# Patient Record
Sex: MALE
[De-identification: names, ages, dates, MRNs, and addresses within clinical notes are randomized per-mention and may not be internally consistent; named-entity substitution may affect disease eponyms.]

---

## 2019-05-09 PROBLEM — Z00.00 ENCOUNTER FOR PREVENTIVE HEALTH EXAMINATION: Status: ACTIVE | Noted: 2019-05-09

## 2019-05-17 ENCOUNTER — APPOINTMENT (OUTPATIENT)
Dept: PULMONOLOGY | Facility: CLINIC | Age: 84
End: 2019-05-17

## 2019-05-20 ENCOUNTER — APPOINTMENT (OUTPATIENT)
Dept: GERIATRICS | Facility: HOME HEALTH | Age: 84
End: 2019-05-20
Payer: MEDICARE

## 2019-05-20 DIAGNOSIS — G89.3 NEOPLASM RELATED PAIN (ACUTE) (CHRONIC): ICD-10-CM

## 2019-05-20 PROCEDURE — 99341 HOME/RES VST NEW SF MDM 15: CPT

## 2019-05-20 NOTE — REASON FOR VISIT
[Initial Eval - Existing Diagnosis] : an initial evaluation of an existing diagnosis [Formal Caregiver] : formal caregiver

## 2019-05-23 PROBLEM — G89.3 CANCER-RELATED PAIN: Status: ACTIVE | Noted: 2019-05-23

## 2019-05-23 RX ORDER — FENTANYL 12 UG/H
12 PATCH, EXTENDED RELEASE TRANSDERMAL
Qty: 10 | Refills: 0 | Status: ACTIVE | COMMUNITY
Start: 2019-05-23 | End: 1900-01-01

## 2019-05-23 RX ORDER — LORAZEPAM 1 MG/1
1 TABLET ORAL
Qty: 20 | Refills: 0 | Status: ACTIVE | COMMUNITY
Start: 2019-05-19

## 2019-05-23 RX ORDER — INSULIN GLARGINE 100 [IU]/ML
100 INJECTION, SOLUTION SUBCUTANEOUS
Qty: 9 | Refills: 0 | Status: ACTIVE | COMMUNITY
Start: 2019-05-20

## 2019-05-24 ENCOUNTER — APPOINTMENT (OUTPATIENT)
Dept: GERIATRICS | Facility: HOME HEALTH | Age: 84
End: 2019-05-24
Payer: MEDICARE

## 2019-05-24 VITALS
HEART RATE: 80 BPM | RESPIRATION RATE: 16 BRPM | SYSTOLIC BLOOD PRESSURE: 110 MMHG | DIASTOLIC BLOOD PRESSURE: 60 MMHG | OXYGEN SATURATION: 93 %

## 2019-05-24 DIAGNOSIS — I50.9 HEART FAILURE, UNSPECIFIED: ICD-10-CM

## 2019-05-24 DIAGNOSIS — Z99.89 OBSTRUCTIVE SLEEP APNEA (ADULT) (PEDIATRIC): ICD-10-CM

## 2019-05-24 DIAGNOSIS — I48.91 UNSPECIFIED ATRIAL FIBRILLATION: ICD-10-CM

## 2019-05-24 DIAGNOSIS — Z99.2 DEPENDENCE ON RENAL DIALYSIS: ICD-10-CM

## 2019-05-24 DIAGNOSIS — G47.33 OBSTRUCTIVE SLEEP APNEA (ADULT) (PEDIATRIC): ICD-10-CM

## 2019-05-24 DIAGNOSIS — C25.9 MALIGNANT NEOPLASM OF PANCREAS, UNSPECIFIED: ICD-10-CM

## 2019-05-24 PROCEDURE — 99349 HOME/RES VST EST MOD MDM 40: CPT

## 2019-05-24 RX ORDER — PREDNISONE 20 MG/1
20 TABLET ORAL
Qty: 10 | Refills: 0 | Status: ACTIVE | COMMUNITY
Start: 2019-02-26

## 2019-05-24 RX ORDER — PEN NEEDLE, DIABETIC 29 G X1/2"
32G X 4 MM NEEDLE, DISPOSABLE MISCELLANEOUS
Qty: 200 | Refills: 0 | Status: ACTIVE | COMMUNITY
Start: 2018-05-14

## 2019-05-24 RX ORDER — ESCITALOPRAM OXALATE 10 MG/1
10 TABLET ORAL
Qty: 30 | Refills: 0 | Status: ACTIVE | COMMUNITY
Start: 2018-12-08

## 2019-05-24 RX ORDER — HYOSCYAMINE SULFATE 0.12 MG/1
0.12 TABLET SUBLINGUAL 4 TIMES DAILY
Qty: 240 | Refills: 0 | Status: ACTIVE | COMMUNITY
Start: 2019-05-24 | End: 1900-01-01

## 2019-05-24 RX ORDER — TORSEMIDE 20 MG/1
20 TABLET ORAL
Qty: 90 | Refills: 0 | Status: ACTIVE | COMMUNITY
Start: 2019-01-03

## 2019-05-24 RX ORDER — METOCLOPRAMIDE 5 MG/1
5 TABLET ORAL
Qty: 90 | Refills: 0 | Status: ACTIVE | COMMUNITY
Start: 2019-05-20

## 2019-05-24 RX ORDER — POTASSIUM BICARBONATE 782 MG/1
20 TABLET, EFFERVESCENT ORAL
Qty: 360 | Refills: 0 | Status: ACTIVE | COMMUNITY
Start: 2018-03-08

## 2019-05-24 RX ORDER — LINAGLIPTIN 5 MG/1
5 TABLET, FILM COATED ORAL
Qty: 90 | Refills: 0 | Status: ACTIVE | COMMUNITY
Start: 2018-10-03

## 2019-05-24 RX ORDER — LOSARTAN POTASSIUM 25 MG/1
25 TABLET, FILM COATED ORAL
Qty: 45 | Refills: 0 | Status: ACTIVE | COMMUNITY
Start: 2018-06-10

## 2019-05-24 RX ORDER — CARVEDILOL 6.25 MG/1
6.25 TABLET, FILM COATED ORAL
Qty: 270 | Refills: 0 | Status: ACTIVE | COMMUNITY
Start: 2018-09-18

## 2019-05-24 RX ORDER — MORPHINE SULFATE 10 MG/5ML
10 SOLUTION ORAL
Qty: 600 | Refills: 0 | Status: DISCONTINUED | COMMUNITY
Start: 2019-05-23 | End: 2019-05-24

## 2019-05-24 RX ORDER — BLOOD SUGAR DIAGNOSTIC
STRIP MISCELLANEOUS
Qty: 25 | Refills: 0 | Status: ACTIVE | COMMUNITY
Start: 2019-02-05

## 2019-05-24 RX ORDER — GABAPENTIN 100 MG/1
100 CAPSULE ORAL
Qty: 60 | Refills: 0 | Status: ACTIVE | COMMUNITY
Start: 2019-02-20

## 2019-05-24 RX ORDER — CEPHALEXIN 500 MG/1
500 CAPSULE ORAL
Qty: 6 | Refills: 0 | Status: ACTIVE | COMMUNITY
Start: 2019-05-01

## 2019-05-24 RX ORDER — AMIODARONE HYDROCHLORIDE 200 MG/1
200 TABLET ORAL
Qty: 90 | Refills: 0 | Status: ACTIVE | COMMUNITY
Start: 2018-02-21

## 2019-05-24 RX ORDER — AZITHROMYCIN 250 MG/1
250 TABLET, FILM COATED ORAL
Qty: 10 | Refills: 0 | Status: ACTIVE | COMMUNITY
Start: 2019-02-22

## 2019-05-24 RX ORDER — ALLOPURINOL 100 MG/1
100 TABLET ORAL
Qty: 90 | Refills: 0 | Status: ACTIVE | COMMUNITY
Start: 2019-02-04

## 2019-05-24 RX ORDER — PANCRELIPASE 120000; 24000; 76000 [USP'U]/1; [USP'U]/1; [USP'U]/1
24000-76000 CAPSULE, DELAYED RELEASE PELLETS ORAL
Qty: 100 | Refills: 0 | Status: ACTIVE | COMMUNITY
Start: 2019-05-14

## 2019-05-24 RX ORDER — OXYCODONE HYDROCHLORIDE 5 MG/5ML
5 SOLUTION ORAL EVERY 4 HOURS
Qty: 900 | Refills: 0 | Status: ACTIVE | COMMUNITY
Start: 2019-05-24 | End: 1900-01-01

## 2019-05-31 ENCOUNTER — APPOINTMENT (OUTPATIENT)
Dept: GERIATRICS | Facility: HOME HEALTH | Age: 84
End: 2019-05-31
Payer: MEDICARE

## 2019-05-31 DIAGNOSIS — K11.7 DISTURBANCES OF SALIVARY SECRETION: ICD-10-CM

## 2019-05-31 DIAGNOSIS — N18.6 END STAGE RENAL DISEASE: ICD-10-CM

## 2019-05-31 DIAGNOSIS — R11.2 NAUSEA WITH VOMITING, UNSPECIFIED: ICD-10-CM

## 2019-05-31 DIAGNOSIS — R06.00 DYSPNEA, UNSPECIFIED: ICD-10-CM

## 2019-05-31 DIAGNOSIS — R45.1 RESTLESSNESS AND AGITATION: ICD-10-CM

## 2019-05-31 DIAGNOSIS — Z51.5 ENCOUNTER FOR PALLIATIVE CARE: ICD-10-CM

## 2019-05-31 PROCEDURE — 99349 HOME/RES VST EST MOD MDM 40: CPT

## 2019-05-31 PROCEDURE — 99344 HOME/RES VST NEW MOD MDM 60: CPT

## 2019-05-31 RX ORDER — SCOPOLAMINE 1 MG/3D
1 PATCH, EXTENDED RELEASE TRANSDERMAL
Qty: 10 | Refills: 0 | Status: ACTIVE | COMMUNITY
Start: 2019-05-31 | End: 1900-01-01

## 2019-05-31 RX ORDER — HALOPERIDOL 2 MG/ML
2 SOLUTION, CONCENTRATE ORAL EVERY 8 HOURS
Qty: 90 | Refills: 0 | Status: ACTIVE | COMMUNITY
Start: 2019-05-31 | End: 1900-01-01

## 2019-07-09 PROBLEM — N18.6 ESRD (END STAGE RENAL DISEASE): Status: ACTIVE | Noted: 2019-05-24

## 2019-07-09 NOTE — ASSESSMENT
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 Sanchez, Dr. Ibrahim, Dr. Xie (pt HCP), and pt family \par \par emotional support provided\par hospice referral f/u on monday depending on pt medical condition  \par f/u visit on Wednesday further pain management as needed\par \par

## 2019-07-09 NOTE — HISTORY OF PRESENT ILLNESS
[FreeTextEntry1] : pt seen in the home due to increasing agitation at pt and caregiver request.\par Pt agitated, oriented to self only Pt sister at bedside.\par pt nurse present reports she has not given pt any ativan or pain medications for 2 days and pt agitation has not improved. + BM + increased fluid in abdomen reports mild pain in abdomen no fever. pt did not sleep last night.\par family reports pt has had many vistors and has been able to participate in meaningful conversations with family and friends until this afternoon.\par based on piror disucssions with pt, his main gaol is comfort, no hospitalization and would like to die in his home. \par \par pt at this time reports pain, and oriented to self. I discussed prognosis with pt and his sister present. pt is declining quickly and requires medications scheduled for optimal relief of his agitation and pain.\par \par I also discussed in detail over phine with pt HCP Dr. Shukla i discussed prognosis of hours to days and need for scheduled medications for pain and agitation. I also discussed need for pt privacy and to have only immediate famiy present as per pt wishes. \par \par pt , pt family and pt HCP in agreement and aware of prognosis of hours to days. hospice referral made. MOLST reviewed, and anticipatory guidance given\par \par

## 2019-07-09 NOTE — PHYSICAL EXAM
[Normal Oral Mucosa] : normal oral mucosa [No Oral Cyanosis] : no oral cyanosis [Outer Ear] : the ears and nose were normal in appearance [Examination Of The Oral Cavity] : the lips and gums were normal [Respiration, Rhythm And Depth] : normal respiratory rhythm and effort [No CVA Tenderness] : no ~M costovertebral angle tenderness [] : no rash [FreeTextEntry1] : confused

## 2019-07-09 NOTE — HISTORY OF PRESENT ILLNESS
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 Pt also with recent fall with clavicular fracture seen by ortho with sling.\par \par pt was recently awarded the lifetime achievement award from [employer] and recently had biography published in book. \par \par pt with occasional pain--reports he is sensiteive to medications, occasional constipation and diarrhea due to tumor. decreased appetite.\par \par pt reports he would like to establish care to avoid hospitalization and to assist in his transition in his home . his wife of over 50 years  3 years ago and he has noted he has been getting weaker. \par he wants to be comfortable, ahas RN care 24 hours a days, hospital bed, oxygen and all medical equipment as needed to remain at home. \par pt would like to be comfortable and he is aware of his prognosis of weeks to months. pt states "I am at peace, I have lived a rich full life, I love my wife and family and I am ready for when the good Lord take me, I have already made arrangements for my " \par pt reports HCP is Dr. Shukla, he is DNR and DNI and will disucss with  regarding AICD deactivation.\par \par pt would like to continue IVF and PD as tolerated at this time.

## 2019-07-09 NOTE — REVIEW OF SYSTEMS
[As noted in HPI] : as noted in HPI [Abdominal Pain] : abdominal pain [Constipation] : constipation [Diarrhea] : diarrhea [As Noted in HPI] : as noted in HPI [Negative] : Endocrine

## 2019-07-09 NOTE — ASSESSMENT
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 Shukla\par pcp is Dr. Ibrahim\par pt has RN 24/7 \par discussed end of life care, prognosis of weeks to months, medication management\par pt goal is full comfort and no hospitalization, would like to continue current management of ESRD with PD and CHF with medications, will continue INR checks at this time--aware this may be stopped in the future\par  support\par hyosamine prn pain\par tylenol olegario TID MDD 3000mg \par \par discussed with Dr. Sanchez, Dr. Ibrahim, Dr. Xie (pt HCP) \par f/u visit on Wednesday further pain management as needed\par \par

## 2019-07-16 NOTE — PHYSICAL EXAM
[General Appearance - Alert] : alert [General Appearance - In No Acute Distress] : in no acute distress [Sclera] : the sclera and conjunctiva were normal [Normal Oral Mucosa] : normal oral mucosa [Strabismus] : no strabismus was seen [Neck Cervical Mass (___cm)] : no neck mass was observed [Oropharynx] : The oropharynx was normal [Jugular Venous Distention Increased] : there was no jugular-venous distention [Respiration, Rhythm And Depth] : normal respiratory rhythm and effort [Apical Impulse] : the apical impulse was normal [Heart Sounds] : normal S1 and S2 [Bowel Sounds] : normal bowel sounds [Abdomen Soft] : soft [] : no hepato-splenomegaly [Abdomen Tenderness] : non-tender [No CVA Tenderness] : no ~M costovertebral angle tenderness [Cranial Nerves] : cranial nerves 2-12 were intact [No Focal Deficits] : no focal deficits [Impaired Insight] : insight and judgment were intact [Oriented To Time, Place, And Person] : oriented to person, place, and time [FreeTextEntry1] : + increased ascites

## 2019-07-16 NOTE — ASSESSMENT
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 advanced CHF, ESRD on PD, afib on ac, new pancreatic tumor with right clavicular fracture with new onset of abdominal pain sensitve to fentanyl patch 12 mcg\par \par will d/c fentanyl and continue to titrate short acting medications\par tylenol 1000 mg liquid with oxycodone 5 mg q8 hrs scheduled with oxycodone 5 mg Q4 hr prn\par ativan prn\par hysocamine 0.125 SL every 4 hrs prn pain\par increase PD to 2000 ml and decrease fluids to 100 ml\par use Cpap at night\par hold coumadin --will likely d/c with inr of 8 continue vitamin K supplements  --to continue discussions\par \par discussed with Dr. Sanchez, Dr. Ibrahim, Dr. Xie (pt HCP) \par f/u visit on Wednesday further pain management as needed\par \par